# Patient Record
(demographics unavailable — no encounter records)

---

## 2024-10-19 NOTE — HISTORY OF PRESENT ILLNESS
[FreeTextEntry1] : Ms. Unger was seen november 2020   seeking a second opinion.  . She was under the care of [facility] doctors, She was diagnosed with atrial fibrillation in 2016, Her EF was 25% in 2018 felt to be tachycardia induced. She was treated with 4 weeks of amiodarone and failed cardioversion.  She had a cardiac arrest april 2019, coronaries  were normal, MRI unable to be done due to body habitus ,  ICD placed.  There have been no hospitalizations since last visit .She denies chest pain, dyspnea, palpitations or syncope. She is not exercising. .

## 2024-10-19 NOTE — CARDIOLOGY SUMMARY
[de-identified] : 10/18/24- afib , nonspecific st abnormality  [de-identified] : 6/21/24-e 35% lvh lae sobia mild mr/tr [de-identified] : 5/1/2019 [facility] ICD [de-identified] : 2019, normal

## 2024-10-19 NOTE — HISTORY OF PRESENT ILLNESS
[FreeTextEntry1] : Ms. Unger was seen november 2020   seeking a second opinion.  . She was under the care of [facility] doctors, She was diagnosed with atrial fibrillation in 2016, Her EF was 25% in 2018 felt to be tachycardia induced. She was treated with 4 weeks of amiodarone and failed cardioversion.  She had a cardiac arrest april 2019, coronaries  were normal, MRI unable to be done due to body habitus ,  ICD placed.  There have been no hospitalizations since last visit .She denies chest pain, dyspnea, palpitations or syncope. She is not exercising. .

## 2024-10-19 NOTE — CARDIOLOGY SUMMARY
[de-identified] : 10/18/24- afib , nonspecific st abnormality  [de-identified] : 6/21/24-e 35% lvh lae sobia mild mr/tr [de-identified] : 5/1/2019 [facility] ICD [de-identified] : 2019, normal

## 2025-01-02 NOTE — ASSESSMENT
[FreeTextEntry1] : Pulmonary complaints- while she does have some prominent airway noise it is quite uniform and heard the same in all lung fields and in her neck suggesting that it is not diffuse scattered bronchospastic disease. Will refer to pulmonary for complete evaluation. As I continue to question her she admitted she was placed on on steroids and an antiviral agent for her influenza.   As I was walking out of the room, she also stated that she was having some intermittent bright red blood per rectum in small quantities for "a while". I discussed the importance of colonoscopy and have referred her for same. She denies orthostatic symptoms, shortness of breath, chest pain.  I again suggested she come in for more regular routine follow-up then sporadic acute care visits

## 2025-01-02 NOTE — REVIEW OF SYSTEMS
[Fatigue] : fatigue [Wheezing] : wheezing [Fever] : no fever [Chills] : no chills [Shortness Of Breath] : no shortness of breath [Cough] : no cough [Dyspnea on Exertion] : no dyspnea on exertion

## 2025-01-02 NOTE — HEALTH RISK ASSESSMENT
[Yes] : Yes [Monthly or less (1 pt)] : Monthly or less (1 point) [1 or 2 (0 pts)] : 1 or 2 (0 points) [Never (0 pts)] : Never (0 points) [No] : In the past 12 months have you used drugs other than those required for medical reasons? No [Audit-CScore] : 1 [Former] : Former [20 or more] : 20 or more [< 15 Years] : < 15 Years

## 2025-01-02 NOTE — PHYSICAL EXAM
[Normal] : no acute distress, well nourished, well developed and well-appearing [No JVD] : no jugular venous distention [No Respiratory Distress] : no respiratory distress  [No Accessory Muscle Use] : no accessory muscle use [Normal Rate] : normal rate  [de-identified] : +UNIFORM expiratory noise in all lung field and neck [de-identified] : Irregularly irregular

## 2025-01-02 NOTE — HISTORY OF PRESENT ILLNESS
[FreeTextEntry1] : 53 y.o. woman diagnosed with flu 8 days ago; states here for "regular follow-up" but she has never been here for regular follow-up routine visit. [de-identified] : here after ED visit to [facility] 12/28 diagnosed with flu. Given steroids and nebulizer.  Still with cough and wants pulmonologist. Denies shortness of breath but admits to chronic fatigue.

## 2025-01-06 NOTE — ASSESSMENT
[FreeTextEntry1] : Acute rectal bleeding  Actively coughing and CHRISTOPHER noted during visit. Unable to perform AURE 2/2 significant CHRISTOPHER. Reports that she needs to make an appointment with a pulmonologist to establish care. Advised her to call back her PCP to discuss persistent respiratory symptoms despite prescribed treatment. Discussed returning to ED especially if SOB worsens or if unable to get in contact with PCP. Stool testing sent today. Discussed plan for colonoscopy. Will defer until she is in better health. She is f/u with cardiologist Dr. Restrepo. Advised her to obtain cardiac clearance for colonoscopy. F/u via telehealth in 4 weeks to schedule colonoscopy. Call office if she has another episode of large amount of BRBPR and will schedule colonoscopy sooner.

## 2025-01-06 NOTE — REVIEW OF SYSTEMS
[Feeling Poorly] : feeling poorly [Cough] : cough [SOB on Exertion] : shortness of breath during exertion [As Noted in HPI] : as noted in HPI [Negative] : Heme/Lymph

## 2025-01-06 NOTE — PHYSICAL EXAM
[TextEntry] :   Physical Exam: Constitutional: Alert. NAD. Healthy appearing. Normal voice and communication. Eyes: Sclera are normal, anicteric. Pulmonary: +coughing, CHRISTOPHER. No respiratory distress. Normal rhythm and effort. Abdomen: Soft, nontender. No distention, masses, hepatosplenomegaly.  Rectal: AURE deferred d/t CHRISTOPHER Skin: Normal color and pigmentation. No rashes. No pallor or jaundice. No palmar erythema. Neurological: AAOx3.

## 2025-01-06 NOTE — HISTORY OF PRESENT ILLNESS
[FreeTextEntry1] :  Ms. WENDIE SENIOR is a 53 year old female PMH of HTN, Afib s/p pacemaker on Eliquis, CHF, asthma, and hx of cardiac arrest 3 years ago after being given "too much" amiodarone, on diltiazem  Presenting with an episode of BRBPR   Last week noted bright red blood mixed with stool  Approximately two months ago first noticed spot of bright red blood when wiping At times blood was associated with straining Last week had episode of "large amount" of bright red blood in toilet Blood was mixed with loose stool No blood in stool in the last few days.  Stool consistency alternates between constipation, formed stool and watery stool. Reports 1-2 brown BMs per day. No prior history of colonoscopy.  Recent [facility] ED admission on 12/29 with worsening cough and body aches Found to be Flu A +. Discharged home on Tamiflu, prednisone, and nebulizer treatments.   Previously on Ozempic for weight loss. Stopped over the summer 2/2 troublesome constipation s/e.   Denies FH CRC, cancer, IBD, celiac disease. Denies alcohol, tobacco or drug use on a regular basis.

## 2025-01-09 NOTE — HISTORY OF PRESENT ILLNESS
[Former] : former [< 20 pack-years] : < 20 pack-years [Never] : never [TextBox_4] : 53F with hx of asthma, former tobacco use ( obesity, hx of cardiac arrest iso amiodarone toxicity s/p ICD, HFrEF (EF 35% 2021), afib on apixaban, DM2, HTN who presents for wheeze and SOB.   PCP: [facility]   Per chart review. Diagnosed with the flu 12/28 in [facility] ER. Given steroids, nebulizer. Saw PCP a few days later, still with cough, wants to see pulm. CXR at [facility] reviewed and shows increased interstitial markings   Today,  -say she's been doing the albuterol inhaler and nebulizer but it only works short term and then she's back to feel sob, coughing -took the steroids one pill a day (20mg) and then realized today that it said to take two at once, so she took the two today  - has some phlegm that she's coughing up still, mild  - has noticed when she lays back she feels sob, which that hasn't happened in a very long time since her heart failure was poorly controlled years ago   - has had asthma since she was a child but only really needed albuterol PRN in the summers; has never really heard herself wheeze like this before; she feels slightly better on the steroids /nebs but definitely not all the way yet ; has never had PFTs, never really seen pulmonary   - she did see Dr. Barry for sleep in 2021, was given a CPAP for moderate HEATHER, cristiano 83% O2, wore the mask intermittently and now hardly at all, still snores loudly through walls, morning HA, tired during the day; her Hgb is chronically elevated- had EPO level in the past wnl   - prior to getting sick she did have a chronic dry cough, was told it could be from entresto -   [TextBox_11] : 0.5 [TextBox_13] : 37 [YearQuit] : 2022

## 2025-01-09 NOTE — PHYSICAL EXAM
[No Acute Distress] : no acute distress [Normal Oropharynx] : normal oropharynx [Normal Appearance] : normal appearance [No Neck Mass] : no neck mass [Normal Rate/Rhythm] : normal rate/rhythm [Normal S1, S2] : normal s1, s2 [No Murmurs] : no murmurs [No Resp Distress] : no resp distress [No Abnormalities] : no abnormalities [Benign] : benign [Normal Gait] : normal gait [No Clubbing] : no clubbing [No Cyanosis] : no cyanosis [FROM] : FROM [Normal Color/ Pigmentation] : normal color/ pigmentation [No Focal Deficits] : no focal deficits [Oriented x3] : oriented x3 [Normal Affect] : normal affect [TextBox_68] : congested cough, diffuse exp wheeze with forced expiration  [TextBox_105] : R leg overall appears slightly larger than L but no pitting edema,pt states R always bigger if she's retaining fluid

## 2025-02-06 NOTE — PHYSICAL EXAM
[Normal Appearance] : normal appearance [Well Groomed] : well groomed [General Appearance - In No Acute Distress] : no acute distress [Heart Sounds] : normal S1 and S2 [Irregularly Irregular] : the rhythm was irregularly irregular [] : no respiratory distress [Respiration, Rhythm And Depth] : normal respiratory rhythm and effort [Auscultation Breath Sounds / Voice Sounds] : lungs were clear to auscultation bilaterally

## 2025-02-06 NOTE — HISTORY OF PRESENT ILLNESS
[Post-hospitalization from ___ Hospital] : Post-hospitalization from [unfilled] Hospital [Admitted on: ___] : The patient was admitted on [unfilled] [Discharged on ___] : discharged on [unfilled] [Discharge Summary] : discharge summary [Pertinent Labs] : pertinent labs [Discharge Med List] : discharge medication list [Med Reconciliation] : medication reconciliation has been completed [Patient Contacted By: ____] : and contacted by [unfilled] [FreeTextEntry2] : 53F with past medical history of atrial fibrillation, CHF, asthma with recent asthma exacerbation secondary to influenza infection requiring steroid taper who presented with altered mental status and admitted for diabetic ketoacidosis, acute metabolic encephalopathy, atrial fibrillation with rapid ventricular rate, acute kidney injury. Patient treated for diabetic ketoacidosis requiring ICU admission for insulin gtt which was eventually transitioned to subcutaneous insulin regimen, patient with newly diagnosed diabetes mellitus with initial hgba1c of 10.8 and repeat confirmed at 11.8 and maintained on insulin regimen at discharge; glargine 14 units sc BID and lispro sliding scale. Patient treated for acute kidney injury secondary to dehydration associated w/ DKA and resolved with IVF. Acute metabolic encephalopathy resolved with return to baseline mental status. Patient treated for atrial fibrillation w/ rapid ventricular rate with atenolol, cardizem and digoxin.  On questioning, she has no idea what an A1c test is all represents.  She also states she has not returned to work since discharge and has no plans to at the present time. Her Jardiance and spironolactone were discontinued although those are not indicated on her discharge summary list.

## 2025-02-06 NOTE — HEALTH RISK ASSESSMENT
[Yes] : Yes [2 - 4 times a month (2 pts)] : 2-4 times a month (2 points) [1 or 2 (0 pts)] : 1 or 2 (0 points) [Never (0 pts)] : Never (0 points) [No] : In the past 12 months have you used drugs other than those required for medical reasons? No [Audit-CScore] : 2 [Former] : Former [20 or more] : 20 or more [< 15 Years] : < 15 Years

## 2025-02-06 NOTE — ASSESSMENT
[FreeTextEntry1] : DM- Continue meds. Check glucose and A1c. Ophthalmology and foot care previously discussed. Urged to increase efforts at weight loss through diet and exercise. Endocrinology input needed and she is to follow-up with Dr. Hellerman.  I reviewed today in detail the rationale and use of A1c and monitoring diabetic long-term control.  She is aware we will follow A1c with goal of <7.0. Last A1c was NOT AT GOAL 11.2 in hospital.    HYPERTENSION- Check electrolytes if on ace inhibitor/arb/diuretic. Continue meds and low salt diet. Urged to increase physical activity and increase efforts at weight loss.  Blood pressure control has been good   Atrial fibrillation - The patients rate is controlled. Continue meds and anticoagulation. Check EKG/heart monitor as indicated. Continue f/u with CV Restrepo.   Nonischemic cardiomyopathy- continue cardiology follow-up. Med adjustments noted. As noted by Dr. Raygoza, last BNP not significantly different from prior, 663 Jan 2025  Current use of long term anticoagulation - Observe for signs and symptoms of bleeding. Continue anticoagulation.   Pulmonary issues- continue medications and follow-up with Dr. Raygoza.  Morbid obesity-strongly recommended gradual weight loss through appropriate aerobic exercise and dietary changes.  She blames her medications for her long-term weight gain.  Compared with the patient's last BMI, today's BMI is higher having gained 13 pounds since her last visit here.  BRBPR-I reviewed the GI consultation note by Dr. Poe.  Colonoscopy to be scheduled  As regards to her working, I indicated she has no infectious disease nor any medical reason why she should not work. I also indicated to her that it is my understanding that no one told her she should not return to work.  Risks/benefits of Pneumovax 23 vaccine for diabetes d/w patient and patient understands and accepts.  Risks/benefits of Tdap vaccine d/w patient and patient understands and accepts.   Difficult venipuncture with butterfly in the right hand.

## 2025-02-06 NOTE — PHYSICAL EXAM
[Normal] : no acute distress, well nourished, well developed and well-appearing [No JVD] : no jugular venous distention [No Respiratory Distress] : no respiratory distress  [No Accessory Muscle Use] : no accessory muscle use [Normal Rate] : normal rate  [de-identified] : +UNIFORM expiratory noise in all lung field and neck [de-identified] : Irregularly irregular

## 2025-02-07 NOTE — CARDIOLOGY SUMMARY
[de-identified] : 4/15/24- afib , nonspecific st abnormality  [de-identified] : 9/2021LVEF 35% [de-identified] : 5/1/2019 [facility] ICD [de-identified] : 2019, normal

## 2025-02-07 NOTE — DISCUSSION/SUMMARY
[AICD Function Normal] : normal AICD function [Routine Follow-up in 3-4 months] : routine follow-up in 3-4 months [Patient] : the patient [FreeTextEntry1] : Follow up with Dr. Restrepo on 2/28/25

## 2025-02-07 NOTE — PROCEDURE
[No] : not [ICD] : Implantable cardioverter-defibrillator [[employer]] : [facility] [DDD] : DDD [Threshold Testing Performed] : Threshold testing was performed [None] : none [Counters Reset] : the counters were reset [Atrial Fibrillation] : atrial fibrillation [de-identified] : Resonate EL ICD D433 [de-identified] : 616851 [de-identified] : 50/130 [de-identified] : 5/1/2019 [de-identified] : 5.5 years [de-identified] : Normal device function. 100% AF burden. Heart failure index is 15

## 2025-02-07 NOTE — HISTORY OF PRESENT ILLNESS
[Palpitations] : no palpitations [SOB] : no dyspnea [Syncope] : no syncope [Dizziness] : no dizziness [Chest Pain] : no chest pain or discomfort [ICD Shocks] : no recent ICD shocks [de-identified] : 52 year old female with hypertension, chronic atrial fibrillation on Eliquis, chronic systolic CHF, cardiac arrest in setting of amiodarone toxicity 2019 s/p ICD, obesity, asthma, current smoker.    [facility] admission from 1/21-1/27/25 for diabetic ketoacidosis, acute metabolic encephalopathy, atrial fibrillation with RVR, TRACI. Prior to admission, she has asthma exacerbation secondary to influenza infection requiring steroid taper.   During [facility] admission, she was treated with insulin drip which eventually transitioned to SC insulin regimen. Treated for TRACI secondary to dehydration associated with TRACI and resolved with IVF. Acute metabolic encephalopathy resolved with return to baseline mental status. Atrial fibrillation with RVR treated with atenolol, cardizem and digoxin. Jardiance and spironolactone on hold. Carvedilol discontinued.

## 2025-02-07 NOTE — REVIEW OF SYSTEMS
[Feeling Fatigued] : feeling fatigued [Fever] : no fever [Headache] : no headache [Weight Gain (___ Lbs)] : [unfilled] ~Ulb weight gain [Chills] : no chills [SOB] : no shortness of breath [Chest Discomfort] : no chest discomfort [Lower Ext Edema] : no extremity edema [Palpitations] : no palpitations [Syncope] : no syncope [Cough] : no cough [Wheezing] : no wheezing [Rash] : no rash [Dizziness] : no dizziness [Confusion] : no confusion was observed [Easy Bleeding] : no tendency for easy bleeding [Easy Bruising] : no tendency for easy bruising

## 2025-02-07 NOTE — CARDIOLOGY SUMMARY
[de-identified] : 4/15/24- afib , nonspecific st abnormality  [de-identified] : 9/2021LVEF 35% [de-identified] : 5/1/2019 [facility] ICD [de-identified] : 2019, normal

## 2025-02-07 NOTE — PROCEDURE
[No] : not [ICD] : Implantable cardioverter-defibrillator [[employer]] : [facility] [DDD] : DDD [Threshold Testing Performed] : Threshold testing was performed [None] : none [Counters Reset] : the counters were reset [Atrial Fibrillation] : atrial fibrillation [de-identified] : Resonate EL ICD D433 [de-identified] : [id number] [de-identified] : 5/1/2019 [de-identified] : 50/130 [de-identified] : 5.5 years [de-identified] : Normal device function. 100% AF burden. Heart failure index is 15

## 2025-02-07 NOTE — HISTORY OF PRESENT ILLNESS
[Palpitations] : no palpitations [SOB] : no dyspnea [Syncope] : no syncope [Dizziness] : no dizziness [Chest Pain] : no chest pain or discomfort [ICD Shocks] : no recent ICD shocks [de-identified] : 52 year old female with hypertension, chronic atrial fibrillation on Eliquis, chronic systolic CHF, cardiac arrest in setting of amiodarone toxicity 2019 s/p ICD, obesity, asthma, current smoker.    [facility] admission from 1/21-1/27/25 for diabetic ketoacidosis, acute metabolic encephalopathy, atrial fibrillation with RVR, TRACI. Prior to admission, she has asthma exacerbation secondary to influenza infection requiring steroid taper.   During [facility] admission, she was treated with insulin drip which eventually transitioned to SC insulin regimen. Treated for TRACI secondary to dehydration associated with TRACI and resolved with IVF. Acute metabolic encephalopathy resolved with return to baseline mental status. Atrial fibrillation with RVR treated with atenolol, cardizem and digoxin. Jardiance and spironolactone on hold. Carvedilol discontinued.

## 2025-02-12 NOTE — ASSESSMENT
[FreeTextEntry1] :  Counseled re A1C & glucose goals Counseled re hypoglycemia Start Metformin  mg once daily with a meal, increase to 2 tabs after 1 week if tolerating Would benefit from GLP1/GIP; will plan to start Mounjaro at next visit with plan to dc insulin  Continue with hold basal insulin  Continue with pre meal insulin if BG >150 mg/dl Initiate Mike CGM--will download application Counseled re diet; use plate method to plan and balance meals  Schedule dilated eye exam  RTO 3/2025

## 2025-02-12 NOTE — HISTORY OF PRESENT ILLNESS
[FreeTextEntry1] : Admitted with DKA following asthma exacerbation/flu requiring steroid course.  A1C11.4%. (2/2025), A1C 6.8% (1/2024), 6.4% (10/2023), 6.1% (5/2023).  PMH: T2D, nonischemic cardiomyopathy, ICD, HTN, Asthma, OA, Afib  Eye exam: needs; has been using reading glasses since discharge due to poor vision. Plans to have a colonoscopy.    Social: ; on the field --on leave    Home diabetes medications: Glargine -- has not been taking  Aspart if BG>150  pre-meal 150-199 3 units >200  4 units    Medications previously tried: Ozempic --diarrhea    SMBG/CGM: Contour meter; did not bring Recall: 130-150s fasting  Dinner: ~200   Did not receive the CGM from the pharm.   Hypoglycemia: none Severe hypoglycemia: none   Dietary patterns: Has adjusted diet since discharge No sugar beverages  ETOH: Social, wine   Physical activity: none currently

## 2025-02-26 NOTE — HISTORY OF PRESENT ILLNESS
[FreeTextEntry8] : 54 year old female here to establish care.  Recently saw PCP Dr. Lomax earlier this month for [facility] [facility] discharge follow up.  History of hypertension, chronic atrial fibrillation on Eliquis, systolic CHF, cardiac arrest due to amiodarone toxicity 2019 s//p ICD, asthma, former smoker (35+ pack-year history), morbid obesity BMI 51, moderate HEATHER, recently diagnosed diabetes with HgbA1c 11.8  Endocrinology NP. Currently on metformin 1000mg daily, insulin sliding scale, CGM. Pending initiation of GLP1-i.  Previously on glargine which was held.   Afib - atenolol 50mg BID and digoxin 125mcg daily, Eliquis 5mg BID.

## 2025-03-01 NOTE — CARDIOLOGY SUMMARY
[de-identified] : 2/28/25- afib , nonspecific st abnormality  [de-identified] : 1/23/25-tds global hypokinesis , lvh consider repeat echo when HR is controlled [de-identified] : 5/1/2019 [facility] ICD [de-identified] : 2019, normal

## 2025-03-01 NOTE — CARDIOLOGY SUMMARY
[de-identified] : 2/28/25- afib , nonspecific st abnormality  [de-identified] : 1/23/25-tds global hypokinesis , lvh consider repeat echo when HR is controlled [de-identified] : 5/1/2019 [facility] ICD [de-identified] : 2019, normal

## 2025-03-01 NOTE — HISTORY OF PRESENT ILLNESS
[FreeTextEntry1] : Ms. Unger was seen november 2020   seeking a second opinion. She has been followed here since.   . She was under the care of [facility] doctors, She was diagnosed with atrial fibrillation in 2016, Her EF was 25% in 2018 felt to be tachycardia induced. She was treated with 4 weeks of amiodarone and failed cardioversion.  She had a cardiac arrest april 2019, coronaries  were normal, MRI unable to be done due to body habitus ,  ICD placed.   1/21-1/27/25 53F with past medical history of atrial fibrillation, CHF, asthma with recent asthma exacerbation secondary to influenza infection requiring steroid taper who presented with altered mental status and admitted for diabetic ketoacidosis, acute metabolic encephalopathy, atrial fibrillation with rapid ventricular rate, acute kidney injury. Patient treated for diabetic ketoacidosis requiring ICU admission for insulin gtt which was eventually transitioned to subcutaneous insulin regimen, patient with newly diagnosed diabetes mellitus with initial hgba1c of 10.8 and repeat confirmed at 11.8 and maintained on insulin regimen at discharge; glargine 14 units sc BID and lispro sliding scale. Patient traeted for acute kidney injury secondary to dehydration associated w/ DKA and resolved with IVF. Acute metabolic encephalopathy resolved with return to baseline mental status. Patient treated for atrial fibrillation w/ rapid ventricular rate with atenolol, Cardizem and digoxin. .She denies chest pain, dyspnea, palpitations or syncope.  Since discharge she has had chest pain daily, mid sternal, sharp, lasts 5 minutes, resolved spontaneously. She denies dyspnea, palpitations or syncope.  She is not exercising. .

## 2025-03-01 NOTE — HISTORY OF PRESENT ILLNESS
[FreeTextEntry1] : Ms. Unger was seen november 2020   seeking a second opinion. She has been followed here since.   . She was under the care of [place] doctors, She was diagnosed with atrial fibrillation in 2016, Her EF was 25% in 2018 felt to be tachycardia induced. She was treated with 4 weeks of amiodarone and failed cardioversion.  She had a cardiac arrest april 2019, coronaries  were normal, MRI unable to be done due to body habitus ,  ICD placed.   1/21-1/27/25 53F with past medical history of atrial fibrillation, CHF, asthma with recent asthma exacerbation secondary to influenza infection requiring steroid taper who presented with altered mental status and admitted for diabetic ketoacidosis, acute metabolic encephalopathy, atrial fibrillation with rapid ventricular rate, acute kidney injury. Patient treated for diabetic ketoacidosis requiring ICU admission for insulin gtt which was eventually transitioned to subcutaneous insulin regimen, patient with newly diagnosed diabetes mellitus with initial hgba1c of 10.8 and repeat confirmed at 11.8 and maintained on insulin regimen at discharge; glargine 14 units sc BID and lispro sliding scale. Patient traeted for acute kidney injury secondary to dehydration associated w/ DKA and resolved with IVF. Acute metabolic encephalopathy resolved with return to baseline mental status. Patient treated for atrial fibrillation w/ rapid ventricular rate with atenolol, Cardizem and digoxin. .She denies chest pain, dyspnea, palpitations or syncope.  Since discharge she has had chest pain daily, mid sternal, sharp, lasts 5 minutes, resolved spontaneously. She denies dyspnea, palpitations or syncope.  She is not exercising. .

## 2025-03-13 NOTE — CARDIOLOGY SUMMARY
[de-identified] : 2/28/25- afib , nonspecific st abnormality  [de-identified] : 1/23/25-tds global hypokinesis , lvh consider repeat echo when HR is controlled [de-identified] : 5/1/2019 [facility] ICD [de-identified] : 2019, normal

## 2025-03-13 NOTE — DISCUSSION/SUMMARY
[Patient] : the patient [FreeTextEntry3] : 2 weeks after resuming Jardiance if labs allow, to be scheduled

## 2025-03-13 NOTE — REASON FOR VISIT
[FreeTextEntry1] : Mini Unger presents for follow up visit and nonfasting laboratories.  At last visit, atenolol was changed to carvedilol 25mg BID, furosemide decreased to 20mg daily and started spironolactone 25mg daily.  Ms. Unger reports tolerating medication changes. No acute symptoms of chest pain, SOB, palpitations, dizziness or syncope.

## 2025-03-13 NOTE — REVIEW OF SYSTEMS
[Weight Gain (___ Lbs)] : no recent weight gain [Weight Loss (___ Lbs)] : no recent weight loss [SOB] : no shortness of breath [Dyspnea on exertion] : not dyspnea during exertion [Chest Discomfort] : no chest discomfort [Palpitations] : no palpitations [Syncope] : no syncope [Cough] : no cough [Wheezing] : no wheezing [Rash] : no rash [Dizziness] : no dizziness [Confusion] : no confusion was observed [Easy Bleeding] : no tendency for easy bleeding [Easy Bruising] : no tendency for easy bruising

## 2025-03-13 NOTE — PHYSICAL EXAM
[No Acute Distress] : no acute distress [Normal S1, S2] : normal S1, S2 [No Murmur] : no murmur [Clear Lung Fields] : clear lung fields [Good Air Entry] : good air entry [No Respiratory Distress] : no respiratory distress  [Normal Gait] : normal gait [Edema ___] : edema [unfilled] [No Rash] : no rash [Moves all extremities] : moves all extremities [No Focal Deficits] : no focal deficits [Normal Speech] : normal speech [Alert and Oriented] : alert and oriented [Normal memory] : normal memory

## 2025-03-13 NOTE — HISTORY OF PRESENT ILLNESS
[FreeTextEntry1] : 52 year old female with hypertension, chronic atrial fibrillation on Eliquis, chronic systolic CHF, cardiac arrest in setting of amiodarone toxicity 2019 s/p ICD, obesity, asthma, current smoker.  [facility] admission from 1/21-1/27/25 for diabetic ketoacidosis, acute metabolic encephalopathy, atrial fibrillation with RVR, TRACI. Prior to admission, she has asthma exacerbation secondary to influenza infection requiring steroid taper.  During [facility] admission, she was treated with insulin drip which eventually transitioned to SC insulin regimen. Treated for TRACI secondary to dehydration associated with TRACI and resolved with IVF. Acute metabolic encephalopathy resolved with return to baseline mental status. Atrial fibrillation with RVR treated with atenolol, Cardizem and digoxin. Jardiance and spironolactone on hold. Carvedilol discontinued.

## 2025-03-14 NOTE — ASSESSMENT
[FreeTextEntry1] :  Mike reviewed, TIR 97%, not requiring insulin anymore Continue with Metformin  mg 2 tabs with dinner Initiate Mounjaro 2.5 mg weekly Will provide letter for work  RTO 4/2025

## 2025-03-14 NOTE — HISTORY OF PRESENT ILLNESS
[FreeTextEntry1] : This visit was provided via telehealth using real-time 2-way audio visual technology. The patient was located at home at the time of the visit. The provider, ALINA FONTAINE, was located in the office ([place]) at the time of the visit. The patient and provider participated in the telehealth encounter. Patient consented to video call.  Admitted with DKA following asthma exacerbation/flu requiring steroid course, 1/2025 A1C11.4%. (2/2025), A1C 6.8% (1/2024), 6.4% (10/2023), 6.1% (5/2023).  PMH: T2D, nonischemic cardiomyopathy, ICD, HTN, Asthma, OA, Afib  Eye exam: needs; has been using reading glasses since discharge due to poor vision. Plans to have a colonoscopy.   Feeling much better.  Looking forward to going back to work.    Social: ; on the field --on leave    Home diabetes medications: Metformin  mg 2 tabs with dinner  Glargine -- has not been taking  Aspart if BG>150  pre-meal-- has not needed  150-199 3 units >200  4 units    Medications previously tried: Ozempic --diarrhea    SMBG/CGM: Contour meter/Mike  14-day average 126 mg/dl, %CV 17.5% Time in Ranges   >250  0%  >180  3%   97%  <70  0% < 54  0%  Had a few failed sensors. Got replacement from Mike.    Hypoglycemia: none Severe hypoglycemia: none   Dietary patterns: Has adjusted diet since discharge No sugar beverages  ETOH: Social, wine   Physical activity: none currently

## 2025-04-01 NOTE — HISTORY OF PRESENT ILLNESS
[FreeTextEntry1] : Mini Unger is a 54-year-old woman long-standing persistent AF, NICM / HFrEF 35%, secondary prevention [employer] ICD following cardiac arrest 2019, DM2, presenting for transfer of ICD management.   Ms. Unger has been followed by Dr. Restrepo since 2020.  She has been persistently in AF since sometime in 2021.  LVEF 35% since 20121.   Regarding her arrhythmia / ICD history (on review of outside records): 1) AF first diagnosed 2016 as paroxysmal  2) 2018: LVEF 25% in s/o persistent AF w/ RVR  3) 2018  successful DCCV after amiodarone load (initial DCCV failed) 4) LVEF improved 45-50% in sinus rhythm 5) 2019 cardiac arrest - [facility] clean 6) Secondary prevention ICD- [facility]  More recently, Mini was admitted with DKA Jan 2025 after a steroid course for asthma exacerbation in the setting of flu.  Her hospital course was notable for difficult rate control.

## 2025-04-01 NOTE — HISTORY OF PRESENT ILLNESS
[FreeTextEntry1] : Mini Unger is a 54-year-old woman long-standing persistent AF, NICM / HFrEF 35%, secondary prevention [employer] ICD following cardiac arrest 2019, DM2, presenting for transfer of ICD management.   Ms. Unger has been followed by Dr. Restrepo since 2020.  She has been persistently in AF since sometime in 2021.  LVEF 35% since 20121.   Regarding her arrhythmia / ICD history (on review of outside records): 1) AF first diagnosed 2016 as paroxysmal  2) 2018: LVEF 25% in s/o persistent AF w/ RVR  3) 2018  successful DCCV after amiodarone load (initial DCCV failed) 4) LVEF improved 45-50% in sinus rhythm 5) 2019 cardiac arrest - [facility] clean 6) Secondary prevention ICD- [facility]  More recently, Mini was admitted with DKA Jan 2025 after a steroid course for asthma exacerbation in the setting of flu.  Her hospital course was notable for difficult rate control.

## 2025-04-01 NOTE — DISCUSSION/SUMMARY
[FreeTextEntry1] : Mini Unger is a 54-year-old woman with 1) Longstanding persistent AF 2) NICM / HFrEF 35% 3) Cardiac arrest 2019 while on amiodarone 4) Secondary prevention dual chamber ICD - [employer] 5) DM2 - diagnosed during January admission with DKA 6) HTN 7) HEATHER 8) BMI 52  - Normal dual chamber ICD function.  - Will transfer remote monitoring, consent signed.  - She will follow-up with pulmonology for HEATHER and endocrinology for DM2 management.  - HR trend is relatively well controlled on Coreg 25mg BID and long-standing diltiazem. Note, she dc'd digoxin-  it may need to be restarted pending HR evaluation. - We reviewed the role of catheter ablation of AF and discussed that, given the longstanding nature of her AF, risk factors should be well controlled beforehand (ie CPAP for HEATHER, good glucose control, BP control).

## 2025-04-01 NOTE — DISCUSSION/SUMMARY
[FreeTextEntry1] : Mini Unger is a 54-year-old woman with 1) Longstanding persistent AF 2) NICM / HFrEF 35% 3) Cardiac arrest 2019 while on amiodarone 4) Secondary prevention dual chamber ICD - [employer] 5) DM2 - diagnosed during January admission with DKA 6) HTN 7) HEATHER 8) BMI 52  - Normal dual chamber ICD function.  - Will transfer remote monitoring, consent signed.  - She will follow-up with pulmonology for HEATHER and endocrinology for DM2 management.  - HR trend is relatively well controlled on Coreg 25mg BID and long-standing diltiazem. Note, she dc'd digoxin-  it may need to be restarted pending HR evaluation. - We reviewed the role of catheter ablation of AF and discussed that, given the longstanding nature of her AF, risk factors should be well controlled beforehand (ie CPAP for HEATHER, good glucose control, BP control).

## 2025-04-01 NOTE — REVIEW OF SYSTEMS
[Feeling Fatigued] : feeling fatigued [Dyspnea on exertion] : dyspnea during exertion [Negative] : Integumentary [Palpitations] : no palpitations [Syncope] : no syncope [Dizziness] : no dizziness [Easy Bleeding] : no tendency for easy bleeding [Easy Bruising] : no tendency for easy bruising

## 2025-04-01 NOTE — PHYSICAL EXAM
[No Acute Distress] : no acute distress [Normal Venous Pressure] : normal venous pressure [No Carotid Bruit] : no carotid bruit [Normal S1, S2] : normal S1, S2 [No Murmur] : no murmur [Clear Lung Fields] : clear lung fields [Good Air Entry] : good air entry [No Respiratory Distress] : no respiratory distress  [Soft] : abdomen soft [No Edema] : no edema [No Rash] : no rash [Moves all extremities] : moves all extremities [No Focal Deficits] : no focal deficits [Normal Speech] : normal speech [Alert and Oriented] : alert and oriented [Normal memory] : normal memory [de-identified] : irreg irreg

## 2025-04-01 NOTE — PHYSICAL EXAM
[No Acute Distress] : no acute distress [Normal Venous Pressure] : normal venous pressure [No Carotid Bruit] : no carotid bruit [Normal S1, S2] : normal S1, S2 [No Murmur] : no murmur [Clear Lung Fields] : clear lung fields [Good Air Entry] : good air entry [No Respiratory Distress] : no respiratory distress  [Soft] : abdomen soft [No Edema] : no edema [No Rash] : no rash [Moves all extremities] : moves all extremities [No Focal Deficits] : no focal deficits [Normal Speech] : normal speech [Alert and Oriented] : alert and oriented [Normal memory] : normal memory [de-identified] : irreg irreg

## 2025-05-22 NOTE — CARDIOLOGY SUMMARY
[de-identified] : 5/23/25- afib , nonspecific st abnormality  [de-identified] : 1/23/25-tds global hypokinesis , lvh consider repeat echo when HR is controlled [de-identified] : 5/1/2019 [facility] ICD [de-identified] : 2019, normal

## 2025-05-22 NOTE — HISTORY OF PRESENT ILLNESS
[FreeTextEntry1] : Ms. Unger was seen november 2020   seeking a second opinion. She has been followed here since.   . She was under the care of [facility], She was diagnosed with atrial fibrillation in 2016, Her EF was 25% in 2018 felt to be tachycardia induced. She was treated with 4 weeks of amiodarone and failed cardioversion.  She had a cardiac arrest april 2019, coronaries  were normal, MRI unable to be done due to body habitus ,  ICD placed.   1/21-1/27/25 53F with past medical history of atrial fibrillation, CHF, asthma with recent asthma exacerbation secondary to influenza infection requiring steroid taper who presented with altered mental status and admitted for diabetic ketoacidosis, acute metabolic encephalopathy, atrial fibrillation with rapid ventricular rate, acute kidney injury. Patient treated for diabetic ketoacidosis requiring ICU admission for insulin gtt which was eventually transitioned to subcutaneous insulin regimen, patient with newly diagnosed diabetes mellitus with initial hgba1c of 10.8 and repeat confirmed at 11.8 and maintained on insulin regimen at discharge; glargine 14 units sc BID and lispro sliding scale. Patient traeted for acute kidney injury secondary to dehydration associated w/ DKA and resolved with IVF. Acute metabolic encephalopathy resolved with return to baseline mental status. Patient treated for atrial fibrillation w/ rapid ventricular rate with atenolol, Cardizem and digoxin. .She denies chest pain, dyspnea, palpitations or syncope.  Since discharge she has had chest pain daily, mid sternal, sharp, lasts 5 minutes, resolved spontaneously. She denies dyspnea, palpitations or syncope.  She is not exercising. .

## 2025-05-23 NOTE — HISTORY OF PRESENT ILLNESS
[FreeTextEntry1] : Ms. Unger was seen november 2020   seeking a second opinion. She has been followed here since.   . She was under the care of [place] [facility] doctors, She was diagnosed with atrial fibrillation in 2016, Her EF was 25% in 2018 felt to be tachycardia induced. She was treated with 4 weeks of amiodarone and failed cardioversion.  She had a cardiac arrest april 2019, coronaries  were normal, MRI unable to be done due to body habitus ,  ICD placed.   1/21-1/27/25 53F with past medical history of atrial fibrillation, CHF, asthma with recent asthma exacerbation secondary to influenza infection requiring steroid taper who presented with altered mental status and admitted for diabetic ketoacidosis, acute metabolic encephalopathy, atrial fibrillation with rapid ventricular rate, acute kidney injury. Patient treated for diabetic ketoacidosis requiring ICU admission for insulin gtt which was eventually transitioned to subcutaneous insulin regimen, patient with newly diagnosed diabetes mellitus with initial hgba1c of 10.8 and repeat confirmed at 11.8 and maintained on insulin regimen at discharge; glargine 14 units sc BID and lispro sliding scale. Patient traeted for acute kidney injury secondary to dehydration associated w/ DKA and resolved with IVF. Acute metabolic encephalopathy resolved with return to baseline mental status. Patient treated for atrial fibrillation w/ rapid ventricular rate with atenolol, Cardizem and digoxin.  Yesterday she felt a discomfort on her left pectoral area, intermittent throughout the day, some palpitations, she feels a heaviness, no shortness of breath.   She is not exercising. She has gone back to work. .     No

## 2025-05-23 NOTE — CARDIOLOGY SUMMARY
[de-identified] : 5/23/25- afib , nonspecific st abnormality  [de-identified] : 1/23/25-tds global hypokinesis , lvh consider repeat echo when HR is controlled [de-identified] : 5/1/2019 [facility] ICD [de-identified] : 2019, normal

## 2025-05-23 NOTE — CARDIOLOGY SUMMARY
[de-identified] : 5/23/25- afib , nonspecific st abnormality  [de-identified] : 1/23/25-tds global hypokinesis , lvh consider repeat echo when HR is controlled [de-identified] : 5/1/2019 [facility] ICD [de-identified] : 2019, normal

## 2025-05-23 NOTE — HISTORY OF PRESENT ILLNESS
[FreeTextEntry1] : Ms. Unger was seen november 2020   seeking a second opinion. She has been followed here since.   . She was under the care of [facility], She was diagnosed with atrial fibrillation in 2016, Her EF was 25% in 2018 felt to be tachycardia induced. She was treated with 4 weeks of amiodarone and failed cardioversion.  She had a cardiac arrest april 2019, coronaries  were normal, MRI unable to be done due to body habitus ,  ICD placed.   1/21-1/27/25 53F with past medical history of atrial fibrillation, CHF, asthma with recent asthma exacerbation secondary to influenza infection requiring steroid taper who presented with altered mental status and admitted for diabetic ketoacidosis, acute metabolic encephalopathy, atrial fibrillation with rapid ventricular rate, acute kidney injury. Patient treated for diabetic ketoacidosis requiring ICU admission for insulin gtt which was eventually transitioned to subcutaneous insulin regimen, patient with newly diagnosed diabetes mellitus with initial hgba1c of 10.8 and repeat confirmed at 11.8 and maintained on insulin regimen at discharge; glargine 14 units sc BID and lispro sliding scale. Patient traeted for acute kidney injury secondary to dehydration associated w/ DKA and resolved with IVF. Acute metabolic encephalopathy resolved with return to baseline mental status. Patient treated for atrial fibrillation w/ rapid ventricular rate with atenolol, Cardizem and digoxin.  Yesterday she felt a discomfort on her left pectoral area, intermittent throughout the day, some palpitations, she feels a heaviness, no shortness of breath.   She is not exercising. She has gone back to work. .

## 2025-06-25 NOTE — PHYSICAL EXAM
[General Appearance - Well Developed] : well developed [Normal Appearance] : normal appearance [Well Groomed] : well groomed [General Appearance - Well Nourished] : well nourished [No Deformities] : no deformities [General Appearance - In No Acute Distress] : no acute distress [Normal Conjunctiva] : the conjunctiva exhibited no abnormalities [Eyelids - No Xanthelasma] : the eyelids demonstrated no xanthelasmas [Low Lying Soft Palate] : low lying soft palate [Elongated Uvula] : elongated uvula [Enlarged Base of the Tongue] : enlargement of the base of the tongue [III] : III [Neck Appearance] : the appearance of the neck was normal [Neck Cervical Mass (___cm)] : no neck mass was observed [Jugular Venous Distention Increased] : there was no jugular-venous distention [Thyroid Diffuse Enlargement] : the thyroid was not enlarged [Thyroid Nodule] : there were no palpable thyroid nodules [Heart Rate And Rhythm] : heart rate was normal and rhythm regular [Heart Sounds] : normal S1 and S2 [Heart Sounds Gallop] : no gallops [Murmurs] : no murmurs [Heart Sounds Pericardial Friction Rub] : no pericardial rub [Auscultation Breath Sounds / Voice Sounds] : lungs were clear to auscultation bilaterally [Abnormal Walk] : normal gait [Musculoskeletal - Swelling] : no joint swelling seen [Motor Tone] : muscle strength and tone were normal [Nail Clubbing] : no clubbing of the fingernails [Cyanosis, Localized] : no localized cyanosis [Petechial Hemorrhages (___cm)] : no petechial hemorrhages [] : no ischemic changes

## 2025-06-25 NOTE — HISTORY OF PRESENT ILLNESS
[FreeTextEntry1] : 54-year-old female with medical history of obesity, asthma, former smoking (<20py, quit 2022), HFrEF (EF 35%), A-fib on apixaban, DM2, hypertension, and moderate sleep apnea presenting to establish care for her sleep apnea. She was initially diagnosed with sleep apnea in 2021 and has intermittently used her PAP machine (?  AirSense 11) with the last use being a year ago.  She had issues with the mask and the machine itself and had discontinued use and then but is interested in restarting therapy again so she comes to me at the referral of her cardiologist as well as pulmonology. Sleep symptoms wise, she has nocturnal awakenings, snoring, and EDS.

## 2025-06-25 NOTE — ASSESSMENT
[FreeTextEntry1] : 54-year-old female with above-mentioned history including moderate sleep apnea presenting to establish care for HEATHER.  Moderate HEATHER  Initial sleep study performed in 2021 was reviewed which overall shows a moderate degree of sleep apnea with an AHI of 20, with 13 minutes at a saturation below 90%.  Although moderate in degree she is symptomatic from sleep apnea and has multiple medical comorbidities including heart failure and atrial fibrillation, which has strong associations to sleep apnea. She seems to have trouble with the mask itself and the tubing more so than the device.  Be that as it may, I have instructed her to set up a follow-up appointment next week and bring in her machine so that functionality, hygiene, and mask type can be fully reviewed.  I have also reached out to [employer] to have her machine connected to mine. She is receptive to using the F30 I mask, which I will have for her during this visit.  If machine seems safe to continue using, we will set up during next visit and have her continue to use it.  This note was partially created using a voice-recognition program.  Typographical errors may occur that escape my detection. Please contact me or hospital if a word or phrase does not appear to fit into the contextual sense

## 2025-07-01 NOTE — ASSESSMENT
[FreeTextEntry1] : 54-year-old female with above-mentioned history including moderate to severe sleep apnea presenting for follow-up.  Moderate HEATHER  - She brought her AirSense 11 with her today which noticeably was shaunna and not kept in the best hygienic condition.  I attempted to clean the device to the best possible in order to ensure that it is still functional and that it is compatible with the F30 I mask that I wanted to provide for her. - Mask was refitted again and she is able to tolerate the starting pressure of 4.  Will order new tubing as well as continuous supply of F30 I mask with a air filte for the machine, so that she can continue with her treatment for sleep apnea.  Prior to using the PAP at home though, I have asked her to clean it as best possible including the water tank with soap and water. - Although the initial HST rated her as moderate, in review of the PAP titration study, on a PAP of 9, her overall AHI was still 28.2, which likely indicates that she has a severe degree of sleep apnea as of 2021. Given her daytime symptoms as well as her cardiovascular history, I have heavily recommended her to continue using PAP once supplies are received.  She is agreeable to treatment and will follow-up with me in about 6 to 8 weeks to review usage.  She is to call me if she does not hear from her DME ([employer]) within 2 to 3 weeks  This note was partially created using a voice-recognition program.  Typographical errors may occur that escape my detection. Please contact me or hospital if a word or phrase does not appear to fit into the contextual sense

## 2025-07-01 NOTE — HISTORY OF PRESENT ILLNESS
[FreeTextEntry1] : 54-year-old female with medical history of obesity, asthma, former smoking (<20py, quit 2022), HFrEF (EF 35%), A-fib on apixaban, DM2, hypertension, and moderate sleep apnea presenting to establish care for her sleep apnea. She was initially diagnosed with sleep apnea in 2021 and has intermittently used her PAP machine (?  AirSense 11) with the last use being a year ago.  She had issues with the mask and the machine itself and had discontinued use and then but is interested in restarting therapy again so she comes to me at the referral of her cardiologist as well as pulmonology. Sleep symptoms wise, she has nocturnal awakenings, snoring, and EDS.  7/1/25 Patient presents for follow-up with her CPAP machine today.  No change in her symptoms.

## 2025-07-01 NOTE — PHYSICAL EXAM
[General Appearance - Well Developed] : well developed [Normal Appearance] : normal appearance [Well Groomed] : well groomed [General Appearance - Well Nourished] : well nourished [No Deformities] : no deformities [General Appearance - In No Acute Distress] : no acute distress [Normal Conjunctiva] : the conjunctiva exhibited no abnormalities [Eyelids - No Xanthelasma] : the eyelids demonstrated no xanthelasmas [Low Lying Soft Palate] : low lying soft palate [Elongated Uvula] : elongated uvula [Enlarged Base of the Tongue] : enlargement of the base of the tongue [III] : III [Heart Rate And Rhythm] : heart rate was normal and rhythm regular [Heart Sounds] : normal S1 and S2 [Heart Sounds Gallop] : no gallops [Murmurs] : no murmurs [Heart Sounds Pericardial Friction Rub] : no pericardial rub [Auscultation Breath Sounds / Voice Sounds] : lungs were clear to auscultation bilaterally [Abnormal Walk] : normal gait [Musculoskeletal - Swelling] : no joint swelling seen [Motor Tone] : muscle strength and tone were normal [Nail Clubbing] : no clubbing of the fingernails [Cyanosis, Localized] : no localized cyanosis [Petechial Hemorrhages (___cm)] : no petechial hemorrhages [] : no ischemic changes [Neck Appearance] : the appearance of the neck was normal [Neck Cervical Mass (___cm)] : no neck mass was observed [Jugular Venous Distention Increased] : there was no jugular-venous distention [Thyroid Diffuse Enlargement] : the thyroid was not enlarged [Thyroid Nodule] : there were no palpable thyroid nodules

## 2025-07-30 NOTE — ASSESSMENT
[FreeTextEntry1] :  Check fasting labs at [facility] Increase Trulicity to 1.5 mg weekly Activity at tolerated  Ensure optimal protein, fiber and water intake  RTO 12/2025 for visit with Dr. Hellerman

## 2025-07-30 NOTE — HISTORY OF PRESENT ILLNESS
[FreeTextEntry1] : Admitted with DKA following asthma exacerbation/flu requiring steroid course, 1/2025 A1C 11.4%. (2/2025), A1C 6.8% (1/2024), 6.4% (10/2023), 6.1% (5/2023).  PMH: T2D, nonischemic cardiomyopathy, ICD, HTN, Asthma, OA, Afib  Eye exam: needs to schedule   Social: ; on the field --on leave    Home diabetes medications: Trulicity 0.75 mg weekly    Medications previously tried: Ozempic --diarrhea  Metformin  mg 2 tabs with dinner  Glargine -- has not been taking  Aspart if BG>150  pre-meal-- has not needed  150-199 3 units >200  4 units   SMBG/CGM: Contour meter; no using anymore Mike anymore  90-150s pre dinner    Hypoglycemia: none Severe hypoglycemia: none   Dietary patterns: B-decaf coffee with creamer, egg sandwich L-Salad OR low carb frozen meal  No sugar beverages  ETOH: Social, wine   Physical activity: basketball at the [employer]